# Patient Record
Sex: FEMALE | URBAN - METROPOLITAN AREA
[De-identification: names, ages, dates, MRNs, and addresses within clinical notes are randomized per-mention and may not be internally consistent; named-entity substitution may affect disease eponyms.]

---

## 2019-09-24 ENCOUNTER — APPOINTMENT (OUTPATIENT)
Age: 24
Setting detail: DERMATOLOGY
End: 2019-09-24

## 2019-09-24 DIAGNOSIS — H05.24 CONSTANT EXOPHTHALMOS: ICD-10-CM

## 2019-09-24 PROBLEM — H05.242 CONSTANT EXOPHTHALMOS, LEFT EYE: Status: ACTIVE | Noted: 2019-09-24

## 2019-09-24 PROBLEM — H02.535 EYELID RETRACTION LEFT LOWER EYELID: Status: ACTIVE | Noted: 2019-09-24

## 2019-09-24 PROCEDURE — 99203 OFFICE O/P NEW LOW 30 MIN: CPT

## 2019-09-24 PROCEDURE — OTHER MIPS QUALITY: OTHER

## 2019-09-24 PROCEDURE — OTHER OTHER: OTHER

## 2019-09-24 PROCEDURE — OTHER OBSERVATION: OTHER

## 2019-09-24 PROCEDURE — OTHER COUNSELING: OTHER

## 2019-09-24 ASSESSMENT — LOCATION DETAILED DESCRIPTION DERM: LOCATION DETAILED: LEFT LATERAL INFERIOR EYELID

## 2019-09-24 ASSESSMENT — LOCATION SIMPLE DESCRIPTION DERM: LOCATION SIMPLE: LEFT INFERIOR EYELID

## 2019-09-24 ASSESSMENT — LOCATION ZONE DERM: LOCATION ZONE: EYELID

## 2019-09-24 NOTE — PROCEDURE: OTHER
Other (Free Text): Checking labs: TSH and TSI for potential thyroid eyelid disease\\nProvided lab slip
Note Text (......Xxx Chief Complaint.): This diagnosis correlates with the
Detail Level: Zone

## 2019-09-24 NOTE — HPI: OTHER
Condition:: Protruding left eyeball. Patient states asymmetry appearance with eyes since little but is unsure if worsening throughout the years.
Please Describe Your Condition:: comes in for a chief complaint of Protruding left eyeball. Patient states asymmetry appearance with eyes since age 9 but is unsure if worsening throughout the years . Patient is bothered by appearance and is here to discuss options.

## 2019-10-22 ENCOUNTER — APPOINTMENT (OUTPATIENT)
Age: 24
Setting detail: DERMATOLOGY
End: 2019-10-23

## 2019-10-22 PROBLEM — H02.535 EYELID RETRACTION LEFT LOWER EYELID: Status: ACTIVE | Noted: 2019-10-22

## 2019-10-22 PROCEDURE — OTHER OTHER: OTHER

## 2019-10-22 PROCEDURE — 21282 LATERAL CANTHOPEXY: CPT

## 2019-10-22 PROCEDURE — OTHER OBSERVATION: OTHER

## 2019-10-22 PROCEDURE — OTHER PRESCRIPTION: OTHER

## 2019-10-22 PROCEDURE — OTHER MIPS QUALITY: OTHER

## 2019-10-22 RX ORDER — ERYTHROMYCIN 5 MG/G
OINTMENT OPHTHALMIC
Qty: 1 | Refills: 1 | Status: ERX | COMMUNITY
Start: 2019-10-22

## 2019-10-22 NOTE — PROCEDURE: OTHER
Note Text (......Xxx Chief Complaint.): This diagnosis correlates with the
Other (Free Text): Procedure: LLL Lateral Canthopexy\\n\\nCosmetic amount paid $2000\\n\\nPre-procedure diagnosis: 1. Eyelid retraction left lower eyelid\\nPost-procedure diagnosis: Same\\nProcedure: LLL Lateral Canthopexy\\nSurgeon: Casiano\\nAssistant: Anahi \\nAnesthesia: General and local\\nEBL: <5 cc\\nComplications: none\\nSpecimen(s): none\\n\\nProcedure in detail: After informed consent was obtained, the patient was brought to the operating room. Next lidocaine 2% with epinephrine was injected into left lower eyelid. A grounding electrode was placed along the patient’s arm. The patient was then prepped and draped in standard sterile fashion.\\n\\nUsing the monopolar cautery, a ~1 cm incision was created just lateral to the lateral commissure on bilateral lateral canthus. The incision was carried deep into the orbicularis tissue just superficial to the periosteum overlying the lateral orbital rim. Hemostasis was achieved with electrocautery. The lateral canthal tissue was identified. Using a double-armed 4-0 mersilene suture, both the superior and inferior joey of the lateral canthal tendon were harnessed and affixed to the periosteum of the lateral orbital rim. The suture was tied off in a 2-1-1 fashion. The orbicularis was closed with interrupted 6-0 vicryl sutures. The skin was closed with interrupted 6-0 plain gut sutures.\\n\\n\\nNo complications. Topical antibiotic ointment was placed over the wounds. The patient tolerated the procedure well and was discharged home in stable condition.
Detail Level: Zone

## 2019-11-05 ENCOUNTER — APPOINTMENT (OUTPATIENT)
Age: 24
Setting detail: DERMATOLOGY
End: 2019-11-05

## 2019-11-05 DIAGNOSIS — Z48.817 ENCOUNTER FOR SURGICAL AFTERCARE FOLLOWING SURGERY ON THE SKIN AND SUBCUTANEOUS TISSUE: ICD-10-CM

## 2019-11-05 PROCEDURE — 99024 POSTOP FOLLOW-UP VISIT: CPT

## 2019-11-05 PROCEDURE — OTHER MIPS QUALITY: OTHER

## 2019-11-05 PROCEDURE — OTHER REASSURANCE: OTHER

## 2019-11-05 PROCEDURE — OTHER COUNSELING: OTHER

## 2019-11-05 ASSESSMENT — LOCATION ZONE DERM: LOCATION ZONE: FACE

## 2019-11-05 ASSESSMENT — LOCATION SIMPLE DESCRIPTION DERM: LOCATION SIMPLE: LEFT TEMPLE

## 2019-11-05 ASSESSMENT — LOCATION DETAILED DESCRIPTION DERM: LOCATION DETAILED: LEFT INFERIOR TEMPLE

## 2019-11-05 NOTE — PROCEDURE: REASSURANCE
Detail Level: Detailed
Additional Notes (Optional): S/p 2 weeks LLL lateral canthopexy \\nContinue erythromycin ointment 10 more days \\nResume normal working out and lifting with job - advising to use knees versus back when lifting extreme weights
Hide Additional Notes?: No

## 2019-12-03 ENCOUNTER — APPOINTMENT (OUTPATIENT)
Age: 24
Setting detail: DERMATOLOGY
End: 2019-12-03

## 2019-12-17 ENCOUNTER — APPOINTMENT (OUTPATIENT)
Age: 24
Setting detail: DERMATOLOGY
End: 2019-12-17

## 2019-12-17 DIAGNOSIS — Z48.817 ENCOUNTER FOR SURGICAL AFTERCARE FOLLOWING SURGERY ON THE SKIN AND SUBCUTANEOUS TISSUE: ICD-10-CM

## 2019-12-17 PROCEDURE — OTHER MIPS QUALITY: OTHER

## 2019-12-17 PROCEDURE — 99024 POSTOP FOLLOW-UP VISIT: CPT

## 2019-12-17 PROCEDURE — OTHER REASSURANCE: OTHER

## 2019-12-17 PROCEDURE — OTHER INTRALESIONAL KENALOG: OTHER

## 2019-12-17 PROCEDURE — OTHER RECOMMENDATIONS: OTHER

## 2019-12-17 PROCEDURE — OTHER COUNSELING: OTHER

## 2019-12-17 ASSESSMENT — LOCATION SIMPLE DESCRIPTION DERM
LOCATION SIMPLE: LEFT EYELID
LOCATION SIMPLE: LEFT TEMPLE

## 2019-12-17 ASSESSMENT — LOCATION ZONE DERM
LOCATION ZONE: FACE
LOCATION ZONE: EYELID

## 2019-12-17 ASSESSMENT — LOCATION DETAILED DESCRIPTION DERM
LOCATION DETAILED: LEFT INFERIOR TEMPLE
LOCATION DETAILED: LEFT LATERAL CANTHUS

## 2019-12-17 NOTE — PROCEDURE: REASSURANCE
Detail Level: Detailed
Additional Notes (Optional): S/p 2 months LLL lateral canthopexy\\nInstructed to patient to massage wound post ILK today \\nD/c erythromycin \\nStart vitamin E oil on scar
Hide Additional Notes?: No

## 2019-12-17 NOTE — PROCEDURE: RECOMMENDATIONS
Recommendation Preamble: The following recommendations were made during the visit:
Detail Level: Zone
Recommendations (Free Text): Tarsorrhaphy \\nQuote $500

## 2019-12-17 NOTE — PROCEDURE: INTRALESIONAL KENALOG
Medical Necessity Clause: This procedure was medically necessary because the lesions that were treated were:
Concentration Of Kenalog Solution Injected (Mg/Ml): 40.0
Treatment Number (Optional): 1
Kenalog Preparation: Kenalog
X Size Of Lesion In Cm (Optional): 0
Expiration Date For Kenalog (Optional): sep 2020
Include Z78.9 (Other Specified Conditions Influencing Health Status) As An Associated Diagnosis?: No
Lot # For Kenalog (Optional): [id number]
Administered By (Optional): minna
Total Volume (Ccs): 0.1
Detail Level: Detailed
Consent: The risks of atrophy were reviewed with the patient.

## 2020-01-02 ENCOUNTER — APPOINTMENT (OUTPATIENT)
Age: 25
Setting detail: DERMATOLOGY
End: 2020-01-02

## 2020-01-02 ENCOUNTER — RX ONLY (RX ONLY)
Age: 25
End: 2020-01-02

## 2020-01-02 PROBLEM — H02.535 EYELID RETRACTION LEFT LOWER EYELID: Status: ACTIVE | Noted: 2020-01-02

## 2020-01-02 PROCEDURE — OTHER MIPS QUALITY: OTHER

## 2020-01-02 PROCEDURE — OTHER OTHER: OTHER

## 2020-01-02 PROCEDURE — 67875 CLOSURE OF EYELID BY SUTURE: CPT | Mod: C

## 2020-01-02 PROCEDURE — OTHER OBSERVATION: OTHER

## 2020-01-02 RX ORDER — ERYTHROMYCIN 5 MG/G
OINTMENT OPHTHALMIC
Qty: 1 | Refills: 1 | Status: ERX

## 2020-01-02 NOTE — PROCEDURE: OTHER
Note Text (......Xxx Chief Complaint.): This diagnosis correlates with the
Detail Level: Zone
Other (Free Text): Procedure: Permanent Lateral OS Tarsorrhaphy\\n\\nCosmetic amount paid $500\\n\\nPre-procedure diagnosis: 1. Eyelid retraction left lower eyelid\\nPost-procedure diagnosis: Same\\nProcedure: Permanent Lateral OS Tarsorrhaphy\\nSurgeon: Casiano\\nAssistant: Anahi \\nAnesthesia: local\\nEBL: <5 cc\\nComplications: none\\nSpecimen(s): none\\n\\nProcedure in detail: After informed consent was obtained, the patient was brought to the operating room. Next lidocaine 2% with epinephrine was injected into left upper and lower eyelid margin. The patient was then prepped and draped in standard sterile fashion.\\n\\nFirst, the upper and lower lids were pinched laterally to determine the amount of eyelid closure. Using the 15 blade and westscott scissors,  approximately 5mm of the upper and lower lid margins were debrided. Two interrupted 6-0 vicryl sutures were placed to close the lateral margins together with improvement in lateral exposure OS. The skin was closed with interrupted 6-0 plain gut sutures.\\n\\nNo complications. Topical antibiotic ointment was placed over the wounds. The patient tolerated the procedure well and was discharged home in stable condition.

## 2020-01-16 ENCOUNTER — APPOINTMENT (OUTPATIENT)
Age: 25
Setting detail: DERMATOLOGY
End: 2020-01-16

## 2020-01-16 DIAGNOSIS — Z48.817 ENCOUNTER FOR SURGICAL AFTERCARE FOLLOWING SURGERY ON THE SKIN AND SUBCUTANEOUS TISSUE: ICD-10-CM

## 2020-01-16 PROCEDURE — OTHER REASSURANCE: OTHER

## 2020-01-16 PROCEDURE — OTHER RETURN TO REFERRING PROVIDER: OTHER

## 2020-01-16 PROCEDURE — OTHER MIPS QUALITY: OTHER

## 2020-01-16 PROCEDURE — OTHER COUNSELING: OTHER

## 2020-01-16 PROCEDURE — OTHER INTRALESIONAL KENALOG: OTHER

## 2020-01-16 PROCEDURE — 99024 POSTOP FOLLOW-UP VISIT: CPT

## 2020-01-16 ASSESSMENT — LOCATION SIMPLE DESCRIPTION DERM: LOCATION SIMPLE: LEFT EYELID

## 2020-01-16 ASSESSMENT — LOCATION ZONE DERM: LOCATION ZONE: EYELID

## 2020-01-16 ASSESSMENT — LOCATION DETAILED DESCRIPTION DERM: LOCATION DETAILED: LEFT LATERAL CANTHUS

## 2020-01-16 NOTE — PROCEDURE: REASSURANCE
Hide Additional Notes?: No
Additional Notes (Optional): S/p 2 week Lateral os tarsorrhaphy wounds healing appropriately \\nInstructed to continue ointment on wound and apply vitamin E oil
Detail Level: Detailed

## 2020-01-16 NOTE — PROCEDURE: INTRALESIONAL KENALOG
Treatment Number (Optional): 1
Consent: The risks of atrophy were reviewed with the patient.
Include Z78.9 (Other Specified Conditions Influencing Health Status) As An Associated Diagnosis?: No
Medical Necessity Clause: This procedure was medically necessary because the lesions that were treated were:
Detail Level: Detailed
Lot # For Kenalog (Optional): [id number]
X Size Of Lesion In Cm (Optional): 0
Total Volume (Ccs): 0.1
Expiration Date For Kenalog (Optional): March 2021
Concentration Of Kenalog Solution Injected (Mg/Ml): 40.0
Administered By (Optional): Stephenie
Kenalog Preparation: Kenalog

## 2020-01-30 ENCOUNTER — APPOINTMENT (OUTPATIENT)
Age: 25
Setting detail: DERMATOLOGY
End: 2020-02-04

## 2020-01-30 DIAGNOSIS — Z48.817 ENCOUNTER FOR SURGICAL AFTERCARE FOLLOWING SURGERY ON THE SKIN AND SUBCUTANEOUS TISSUE: ICD-10-CM

## 2020-01-30 PROCEDURE — OTHER COUNSELING: OTHER

## 2020-01-30 PROCEDURE — OTHER MIPS QUALITY: OTHER

## 2020-01-30 PROCEDURE — OTHER RETURN TO REFERRING PROVIDER: OTHER

## 2020-01-30 PROCEDURE — OTHER REASSURANCE: OTHER

## 2020-01-30 PROCEDURE — OTHER OTHER: OTHER

## 2020-01-30 ASSESSMENT — LOCATION DETAILED DESCRIPTION DERM: LOCATION DETAILED: LEFT LATERAL CANTHUS

## 2020-01-30 ASSESSMENT — LOCATION SIMPLE DESCRIPTION DERM: LOCATION SIMPLE: LEFT EYELID

## 2020-01-30 ASSESSMENT — LOCATION ZONE DERM: LOCATION ZONE: EYELID

## 2020-01-30 NOTE — HPI: OTHER
Condition:: S/P Permanent Lateral OS Tarsorrhaphy 1/2/20 S/P eyelid retraction OS lower lid 10/22/19,S/P kenalog injection x 2 OS on 12/17/19 and 1/16/20
Please Describe Your Condition:: Pt has noticed significant improvements. Per pt no complaints pt is happy with her results she states she is using vitamin E oil

## 2020-01-30 NOTE — PROCEDURE: OTHER
Other (Free Text): Patient healing appropriately \\nPlan:\\n1. Continue Vitamin E ointment \\n2. Ok to continue Maxitrol ointment
Detail Level: Zone
Note Text (......Xxx Chief Complaint.): This diagnosis correlates with the